# Patient Record
Sex: FEMALE | Race: BLACK OR AFRICAN AMERICAN
[De-identification: names, ages, dates, MRNs, and addresses within clinical notes are randomized per-mention and may not be internally consistent; named-entity substitution may affect disease eponyms.]

---

## 2018-09-13 NOTE — EMERGENCY ROOM REPORT
History of Present Illness


General


Chief Complaint:  Skin Rash/Abscess





Present Illness


Allergies:  


Coded Allergies:  


     No Known Allergies (Unverified , 9/15/14)





Nursing Documentation-PMH


Hx Cardiac Problems:  No


Hx Cancer:  No


Hx Gastrointestinal Problems:  Yes - Gastritis and Hernia


Hx Neurological Problems:  No





Physical Exam





Vital Signs








  Date Time  Temp Pulse Resp B/P (MAP) Pulse Ox O2 Delivery O2 Flow Rate FiO2


 


9/12/18 22:46 97.8 64 16 145/87 99 Room Air  





 97.9       











Medical Decision Making


Diagnostic Impression:  


 Primary Impression:  


 Cellulitis





Last Vital Signs








  Date Time  Temp Pulse Resp B/P (MAP) Pulse Ox O2 Delivery O2 Flow Rate FiO2


 


9/13/18 00:00 97.9 78 16 145/87 99 Room Air  





 97.9       








Status:  improved


Disposition:  HOME, SELF-CARE


Condition:  Stable


Scripts


Cephalexin* (KEFLEX*) 500 Mg Capsule


500 MG ORAL EVERY 6 HOURS, #40 CAP


   Prov: Chele Delaney MD         9/12/18 


Trimethoprim/Sulfamethoxazole 160/800* (BACTRIM DS TABLET*) 1 Each Tablet


1 TAB ORAL TWICE A DAY, #20 TAB


   Prov: Chele Delaney MD         9/12/18


Referrals:  


NOT CHOSEN [facility]/MD,REFERRING (PCP)


Patient Instructions:  Cellulitis, Easy-to-Read











Chele Delaney MD Sep 13, 2018 04:57

## 2019-07-21 ENCOUNTER — HOSPITAL ENCOUNTER (EMERGENCY)
Dept: HOSPITAL 72 - EMR | Age: 59
Discharge: LEFT BEFORE DISCHARGE | End: 2019-07-21
Payer: MEDICARE

## 2019-07-21 VITALS — DIASTOLIC BLOOD PRESSURE: 79 MMHG | SYSTOLIC BLOOD PRESSURE: 137 MMHG

## 2019-07-21 VITALS — WEIGHT: 124 LBS | HEIGHT: 67 IN | BODY MASS INDEX: 19.46 KG/M2

## 2019-07-21 DIAGNOSIS — R07.9: ICD-10-CM

## 2019-07-21 DIAGNOSIS — M79.602: Primary | ICD-10-CM

## 2019-07-21 DIAGNOSIS — Z88.6: ICD-10-CM

## 2019-07-21 LAB
ADD MANUAL DIFF: NO
ALBUMIN SERPL-MCNC: 4.6 G/DL (ref 3.4–5)
ALBUMIN/GLOB SERPL: 1.5 {RATIO} (ref 1–2.7)
ALP SERPL-CCNC: 79 U/L (ref 46–116)
ALT SERPL-CCNC: 19 U/L (ref 12–78)
ANION GAP SERPL CALC-SCNC: 7 MMOL/L (ref 5–15)
AST SERPL-CCNC: 22 U/L (ref 15–37)
BASOPHILS NFR BLD AUTO: 2.2 % (ref 0–2)
BILIRUB SERPL-MCNC: 0.3 MG/DL (ref 0.2–1)
BUN SERPL-MCNC: 10 MG/DL (ref 7–18)
CALCIUM SERPL-MCNC: 9.5 MG/DL (ref 8.5–10.1)
CHLORIDE SERPL-SCNC: 102 MMOL/L (ref 98–107)
CK SERPL-CCNC: 146 U/L (ref 26–308)
CO2 SERPL-SCNC: 33 MMOL/L (ref 21–32)
CREAT SERPL-MCNC: 0.8 MG/DL (ref 0.55–1.3)
EOSINOPHIL NFR BLD AUTO: 5 % (ref 0–3)
ERYTHROCYTE [DISTWIDTH] IN BLOOD BY AUTOMATED COUNT: 10.9 % (ref 11.6–14.8)
GLOBULIN SER-MCNC: 3.1 G/DL
HCT VFR BLD CALC: 40.8 % (ref 37–47)
HGB BLD-MCNC: 13.6 G/DL (ref 12–16)
LYMPHOCYTES NFR BLD AUTO: 52.1 % (ref 20–45)
MCV RBC AUTO: 93 FL (ref 80–99)
MONOCYTES NFR BLD AUTO: 4 % (ref 1–10)
NEUTROPHILS NFR BLD AUTO: 36.7 % (ref 45–75)
PLATELET # BLD: 263 K/UL (ref 150–450)
POTASSIUM SERPL-SCNC: 3.7 MMOL/L (ref 3.5–5.1)
RBC # BLD AUTO: 4.38 M/UL (ref 4.2–5.4)
SODIUM SERPL-SCNC: 142 MMOL/L (ref 136–145)
WBC # BLD AUTO: 4.2 K/UL (ref 4.8–10.8)

## 2019-07-21 PROCEDURE — 36415 COLL VENOUS BLD VENIPUNCTURE: CPT

## 2019-07-21 PROCEDURE — 99284 EMERGENCY DEPT VISIT MOD MDM: CPT

## 2019-07-21 PROCEDURE — 93005 ELECTROCARDIOGRAM TRACING: CPT

## 2019-07-21 PROCEDURE — 85651 RBC SED RATE NONAUTOMATED: CPT

## 2019-07-21 PROCEDURE — 84484 ASSAY OF TROPONIN QUANT: CPT

## 2019-07-21 PROCEDURE — 82550 ASSAY OF CK (CPK): CPT

## 2019-07-21 PROCEDURE — 85025 COMPLETE CBC W/AUTO DIFF WBC: CPT

## 2019-07-21 PROCEDURE — 86140 C-REACTIVE PROTEIN: CPT

## 2019-07-21 PROCEDURE — 80053 COMPREHEN METABOLIC PANEL: CPT

## 2019-07-21 NOTE — EMERGENCY ROOM REPORT
History of Present Illness


General


Chief Complaint:  Pain


Source:  Patient





Present Illness


HPI


58 YO female presents to the ED c/O 10/10 in severity pain in the Left 

posterior upper back with radiation down the left arm, left sided chest pain 

that is sharp intermittently sometimes after feeling left sided head pain. Pt. 

describes intermittent sharp shooting/stabbing pain in the left temple with 

tenderness x 3 days.  Denies eye pain or changes in vision. Denies cough or 

SOB. Pt. reports she had a fall 4 days ago but did not land on any of the areas 

she is experiencing pain. She reports falling forward onto her knees. Denies 

hitting her head, denies LOC. denies dizziness. Pt. denies N/V/F/C, Midline 

neck or back pain. pt. reports also feeling very discombobulated. She reports 

high stress and currently moving out of her home because of discovering mold in 

the kitchen drawers. Denies psychiatric Hx. Denies drug use. Reports she only 

attempted to quit smoking as well as caffeine use cold-turkey 4 days ago as 

well she states this could also be contributing to her symptoms.


Allergies:  


Coded Allergies:  


     ASPIRIN (Verified  Allergy, Unknown, 19)





Patient History


Past Medical History:  see triage record


Past Surgical History:  none


Pertinent Family History:  none


Pregnant Now:  No


Reviewed Nursing Documentation:  PMH: Agreed; PSxH: Agreed





Nursing Documentation-PMH


Hx Cardiac Problems:  No


Hx Cancer:  No


Hx Gastrointestinal Problems:  Yes - Gastritis and Hernia


Hx Neurological Problems:  No





Review of Systems


All Other Systems:  negative except mentioned in HPI





Physical Exam





Vital Signs








  Date Time  Temp Pulse Resp B/P (MAP) Pulse Ox O2 Delivery O2 Flow Rate FiO2


 


19 13:52 98.1 80 15 137/79 (98) 97 Room Air  








Psychiatric:  anxious


Lymphatic:  no adenopathy





Medical Decision Making


PA Attestation


Dr. Gil Is my supervising Physician whom patient management has been 

discussed with.


Diagnostic Impression:  


 Primary Impression:  


 Left arm pain


 Additional Impression:  


 Sensation of chest tightness


ER Course


58 YO female presents to the ED c/O 10/10 in severity pain in the Left 

posterior upper back with radiation down the left arm, left sided chest pain 

that is sharp intermittently sometimes after feeling left sided head pain. Pt. 

describes intermittent sharp shooting/stabbing pain in the left temple with 

tenderness x 3 days.  Denies eye pain or changes in vision. Denies cough or 

SOB. Pt. reports she had a fall 4 days ago but did not land on any of the areas 

she is experiencing pain. She reports falling forward onto her knees. Denies 

hitting her head, denies LOC. denies dizziness. Pt. denies N/V/F/C, Midline 

neck or back pain. pt. reports also feeling very discombobulated. She reports 

high stress and currently moving out of her home because of discovering mold in 

the kitchen drawers. Denies psychiatric Hx. Denies drug use. Reports she only 

attempted to quit smoking as well as caffeine use cold-turkey 4 days ago as 

well she states this could also be contributing to her symptoms.





Ddx considered but are not limited to Muscle strain/spasm, MI, pneumonia, 

contusion, costochondritis, PE, ACS, Shoulder strain, Chest wall contusion. 

aortic dissection.Giant Cell arteritis just to name a few. 





Vital signs: are WNL, pt. is afebrile


H&PE are most consistent with Soft tissue / muscular injury due to severe ttp 

to superficial palpation on musculature and not over bony areas.  NO 

neurological deficits, Non-toxic in appearance, Anxious but otherwise NAD.  

suspect muscle strain in combination with acute caffeine &  tobacco withdrawals.





ORDERS: 





- EK NSR, no ST elevation , no [id number].


-CBC: 


-CMP: 


-CRP/ ESR: WNL 


-CK: WNL


-Troponins: WNL


-UA: Pending- Pt. eloped and did not give urine


-UDS: Pending- Pt. eloped and did not give urine





ED INTERVENTIONS: 





- Tylenol 1g


-Robaxin 750mg 


-Lidoderm TP








DISPOSITION: PT. Eloped  prior to re-evaluation/further assessment after 

interventions. 


-PT. did have public verbal aggressive argument in the Hallway with Nurse and 

ED technician.





Labs








Test


  19


14:35


 


White Blood Count


  4.2 K/UL


(4.8-10.8)


 


Red Blood Count


  4.38 M/UL


(4.20-5.40)


 


Hemoglobin


  13.6 G/DL


(12.0-16.0)


 


Hematocrit


  40.8 %


(37.0-47.0)


 


Mean Corpuscular Volume 93 FL (80-99) 


 


Mean Corpuscular Hemoglobin


  31.1 PG


(27.0-31.0)


 


Mean Corpuscular Hemoglobin


Concent 33.3 G/DL


(32.0-36.0)


 


Red Cell Distribution Width


  10.9 %


(11.6-14.8)


 


Platelet Count


  263 K/UL


(150-450)


 


Mean Platelet Volume


  6.2 FL


(6.5-10.1)


 


Neutrophils (%) (Auto)


  36.7 %


(45.0-75.0)


 


Lymphocytes (%) (Auto)


  52.1 %


(20.0-45.0)


 


Monocytes (%) (Auto)


  4.0 %


(1.0-10.0)


 


Eosinophils (%) (Auto)


  5.0 %


(0.0-3.0)


 


Basophils (%) (Auto)


  2.2 %


(0.0-2.0)


 


Erythrocyte Sedimentation Rate


  15 MM/HR


(0-30)


 


Sodium Level


  142 MMOL/L


(136-145)


 


Potassium Level


  3.7 MMOL/L


(3.5-5.1)


 


Chloride Level


  102 MMOL/L


()


 


Carbon Dioxide Level


  33 MMOL/L


(21-32)


 


Anion Gap


  7 mmol/L


(5-15)


 


Blood Urea Nitrogen


  10 mg/dL


(7-18)


 


Creatinine


  0.8 MG/DL


(0.55-1.30)


 


Estimat Glomerular Filtration


Rate > 60 mL/min


(>60)


 


Glucose Level


  96 MG/DL


()


 


Calcium Level


  9.5 MG/DL


(8.5-10.1)


 


Total Bilirubin


  0.3 MG/DL


(0.2-1.0)


 


Aspartate Amino Transf


(AST/SGOT) 22 U/L (15-37) 


 


 


Alanine Aminotransferase


(ALT/SGPT) 19 U/L (12-78) 


 


 


Alkaline Phosphatase


  79 U/L


()


 


Total Creatine Kinase


  146 U/L


()


 


Troponin I


  0.005 ng/mL


(0.000-0.056)


 


C-Reactive Protein,


Quantitative < 0.4 mg/dL


(0.00-0.90)


 


Total Protein


  7.7 G/DL


(6.4-8.2)


 


Albumin


  4.6 G/DL


(3.4-5.0)


 


Globulin 3.1 g/dL 


 


Albumin/Globulin Ratio 1.5 (1.0-2.7) 








EKG Diagnostic Results


EP Interpretation:  Dr. Gil


Rate:  normal - 61 BPM


Rhythm:  NSR


ST Segments:  no acute changes


ASA given to the pt in ED:  No


PA Scribe Text


This Interpretation was scribed by SIRIA Quinonez.





Last Vital Signs








  Date Time  Temp Pulse Resp B/P (MAP) Pulse Ox O2 Delivery O2 Flow Rate FiO2


 


19 14:06 98.1 87 15 137/79 97 Room Air  








Disposition:  ELOPED


Condition:  Unknown











Lauren Quinonez 2019 14:35

## 2019-07-21 NOTE — NUR
ELOPEMENT:



pt left the room without notifying staff, pt is aox4,. pain medicine given 
prior to elopement.

## 2019-07-21 NOTE — NUR
ED Nurse Note:



pt walked in due to pain in the left shoulder that radiates to the left arm 
started 3 days ago, pt stated she fell, denies hitting the head, pt stated she 
fell on her knee. pt is also complaining of pain in the right lower back that 
radiates to left leg. pt stated its been happening for the last 7 years. hamzah jimenez on bedside. will continue to monitor.

## 2019-07-22 NOTE — CARDIOLOGY REPORT
--------------- APPROVED REPORT --------------





EKG Measurement

Heart [id number]

SD [id number]

[id number]

[id number]

[id number]





Normal sinus rhythm

Septal infarct, age undetermined

Abnormal ECG